# Patient Record
Sex: FEMALE | Race: WHITE | NOT HISPANIC OR LATINO | Employment: OTHER | URBAN - METROPOLITAN AREA
[De-identification: names, ages, dates, MRNs, and addresses within clinical notes are randomized per-mention and may not be internally consistent; named-entity substitution may affect disease eponyms.]

---

## 2022-01-05 ENCOUNTER — NEW PATIENT (OUTPATIENT)
Dept: URBAN - METROPOLITAN AREA CLINIC 49 | Facility: CLINIC | Age: 77
End: 2022-01-05

## 2022-01-05 DIAGNOSIS — H25.813: ICD-10-CM

## 2022-01-05 DIAGNOSIS — H40.1134: ICD-10-CM

## 2022-01-05 DIAGNOSIS — H35.363: ICD-10-CM

## 2022-01-05 DIAGNOSIS — H35.013: ICD-10-CM

## 2022-01-05 PROCEDURE — 92134 CPTRZ OPH DX IMG PST SGM RTA: CPT

## 2022-01-05 PROCEDURE — 92004 COMPRE OPH EXAM NEW PT 1/>: CPT

## 2022-01-05 PROCEDURE — 76514 ECHO EXAM OF EYE THICKNESS: CPT

## 2022-01-05 ASSESSMENT — TONOMETRY
OD_IOP_MMHG: 16
OS_IOP_MMHG: 11
OS_IOP_MMHG: 16
OD_IOP_MMHG: 12

## 2022-01-05 ASSESSMENT — VISUAL ACUITY
OD_CC: 20/50+2
OU_SC: 20/40
OU_CC: J1
OD_GLARE: 20/25-2
OS_SC: 20/50-1
OD_SC: 20/60
OD_GLARE: 20/50
OS_CC: 20/40+2
OS_PH: 20/30-2
OS_GLARE: 20/40
OS_GLARE: 20/40
OD_PH: 20/30+2

## 2022-01-05 ASSESSMENT — PACHYMETRY
OD_CT_UM: 600
OS_CT_UM: 620

## 2022-01-05 NOTE — PATIENT DISCUSSION
Discussed with patient,  patient states no blood pressure issues.  recommend patient continue with primary care.

## 2022-01-05 NOTE — PATIENT DISCUSSION
iop in a good range today,  discussed with patient it is recommended patient have  hvf 30-2/oct rnfl.

## 2023-06-09 ENCOUNTER — COMPREHENSIVE EXAM (OUTPATIENT)
Dept: URBAN - METROPOLITAN AREA CLINIC 49 | Facility: CLINIC | Age: 78
End: 2023-06-09

## 2023-06-09 DIAGNOSIS — H52.4: ICD-10-CM

## 2023-06-09 DIAGNOSIS — H35.3130: ICD-10-CM

## 2023-06-09 DIAGNOSIS — H25.813: ICD-10-CM

## 2023-06-09 PROCEDURE — 92015 DETERMINE REFRACTIVE STATE: CPT

## 2023-06-09 PROCEDURE — 92134 CPTRZ OPH DX IMG PST SGM RTA: CPT

## 2023-06-09 PROCEDURE — 92014 COMPRE OPH EXAM EST PT 1/>: CPT

## 2023-06-09 ASSESSMENT — KERATOMETRY
OS_AXISANGLE2_DEGREES: 152
OS_K1POWER_DIOPTERS: 46.50
OD_K2POWER_DIOPTERS: 47.75
OS_AXISANGLE_DEGREES: 062
OD_K1POWER_DIOPTERS: 47.75
OD_AXISANGLE2_DEGREES: 90
OS_K2POWER_DIOPTERS: 47.75
OD_AXISANGLE_DEGREES: 180

## 2023-06-09 ASSESSMENT — VISUAL ACUITY
OS_CC: 20/30-2
OD_CC: 20/50
OD_GLARE: 20/50
OS_GLARE: 20/50
OS_GLARE: 20/50
OU_CC: J1-1 @ 16"
OD_GLARE: 20/50

## 2023-06-09 ASSESSMENT — TONOMETRY
OS_IOP_MMHG: 10
OD_IOP_MMHG: 13
OD_IOP_MMHG: 17
OS_IOP_MMHG: 15